# Patient Record
Sex: FEMALE | Race: WHITE | Employment: FULL TIME | URBAN - METROPOLITAN AREA
[De-identification: names, ages, dates, MRNs, and addresses within clinical notes are randomized per-mention and may not be internally consistent; named-entity substitution may affect disease eponyms.]

---

## 2018-04-05 PROCEDURE — 83001 ASSAY OF GONADOTROPIN (FSH): CPT | Performed by: FAMILY MEDICINE

## 2018-04-05 PROCEDURE — 82670 ASSAY OF TOTAL ESTRADIOL: CPT | Performed by: FAMILY MEDICINE

## 2018-04-05 PROCEDURE — 36415 COLL VENOUS BLD VENIPUNCTURE: CPT | Performed by: FAMILY MEDICINE

## 2019-05-02 PROCEDURE — 82784 ASSAY IGA/IGD/IGG/IGM EACH: CPT | Performed by: FAMILY MEDICINE

## 2019-05-02 PROCEDURE — 87389 HIV-1 AG W/HIV-1&-2 AB AG IA: CPT | Performed by: FAMILY MEDICINE

## 2019-05-02 PROCEDURE — 87591 N.GONORRHOEAE DNA AMP PROB: CPT | Performed by: FAMILY MEDICINE

## 2019-05-02 PROCEDURE — 87491 CHLMYD TRACH DNA AMP PROBE: CPT | Performed by: FAMILY MEDICINE

## 2019-05-02 PROCEDURE — 86803 HEPATITIS C AB TEST: CPT | Performed by: FAMILY MEDICINE

## 2019-08-23 PROCEDURE — 87491 CHLMYD TRACH DNA AMP PROBE: CPT | Performed by: FAMILY MEDICINE

## 2019-08-23 PROCEDURE — 87591 N.GONORRHOEAE DNA AMP PROB: CPT | Performed by: FAMILY MEDICINE

## 2019-08-23 PROCEDURE — 86803 HEPATITIS C AB TEST: CPT | Performed by: FAMILY MEDICINE

## 2020-10-27 PROBLEM — R93.1 ECHOCARDIOGRAM ABNORMAL: Status: ACTIVE | Noted: 2020-10-27

## 2021-04-16 PROBLEM — D68.2 FACTOR V DEFICIENCY (HCC): Status: ACTIVE | Noted: 2021-04-16

## 2021-11-16 ENCOUNTER — LAB ENCOUNTER (OUTPATIENT)
Dept: LAB | Age: 46
End: 2021-11-16
Payer: COMMERCIAL

## 2021-11-16 DIAGNOSIS — G62.9 PERIPHERAL NEUROPATHY: Primary | ICD-10-CM

## 2021-11-16 DIAGNOSIS — I82.409 ACUTE DEEP VEIN THROMBOSIS OF LEG (HCC): ICD-10-CM

## 2021-11-16 DIAGNOSIS — I73.9 PERIPHERAL VASCULAR DISEASE (HCC): ICD-10-CM

## 2021-11-16 PROCEDURE — 36415 COLL VENOUS BLD VENIPUNCTURE: CPT

## 2021-11-16 PROCEDURE — 85025 COMPLETE CBC W/AUTO DIFF WBC: CPT

## 2021-11-16 PROCEDURE — 80048 BASIC METABOLIC PNL TOTAL CA: CPT

## 2021-11-16 PROCEDURE — 82306 VITAMIN D 25 HYDROXY: CPT

## 2021-12-11 ENCOUNTER — LAB REQUISITION (OUTPATIENT)
Dept: SURGERY | Age: 46
End: 2021-12-11
Payer: COMMERCIAL

## 2021-12-11 DIAGNOSIS — Z01.818 PREOP EXAMINATION: ICD-10-CM

## 2023-01-22 NOTE — ED QUICK NOTES
Patient states waking up with heart burn at 4:45. States taking 6 Tums, Renetidine & baking soda with no relief. History of cardiomyopathy.

## 2024-03-20 NOTE — PROGRESS NOTES
CHIEF COMPLAINT:     Chief Complaint   Patient presents with    Cough     Cough,congestion, chest tightness x5days       HPI:   Lluvia Grimes is a 48 year old female who presents for upper respiratory symptoms for  4 days. Patient reports congestion, green mucous colored nasal discharge, dry cough.  Associated symptoms include fatigue, sore throat either from pnd or cough, body aches, chilled last night.  Denies fever, headache.  Symptoms have been consistent since onset.  Treating symptoms with cough syrup, nyquil, lozenges.  Used albuterol inhaler a few times today.  States she is prone to bronchitis and pneumonia.       + hx of COVID 2020; No COVID exposure  States had colonoscopy the day prior to getting sick.  No recent travel.  No large gatherings.    Other conditions:   BMI: Body mass index is 22.55 kg/m².      Current Outpatient Medications   Medication Sig Dispense Refill    RABEprazole Sodium 20 MG Oral Tab EC Take 1 tablet (20 mg total) by mouth daily.      levothyroxine 25 MCG Oral Tab TAKE 1 TABLET BY MOUTH DAILY FOR 6 DAYS AND 2 TABLETS DAILY ON SUNDAY. SEPARATE FROM VITAMINS BY AT LEAST 4 HOURS AND 20 MINUTES BEFORE BREAKFAST      benzonatate 200 MG Oral Cap Take 1 capsule (200 mg total) by mouth 3 (three) times daily as needed for cough. 30 capsule 0    predniSONE 20 MG Oral Tab TAKE 1 TABLET BY MOUTH RIGHT AWAY FOR REACTION AS DIRECTED      estradiol 0.1 MG/GM Vaginal Cream Place 1 g vaginally 3 (three) times a week.      buPROPion  MG Oral Tablet 12 Hr Take 2 tablets (300 mg total) by mouth every morning.      ondansetron 4 MG Oral Tablet Dispersible Place 1 tablet (4 mg total) under the tongue 2 (two) times daily as needed.      lisdexamfetamine 40 MG Oral Cap Take 1 capsule (40 mg total) by mouth every morning.      OMEPRAZOLE 40 MG Oral Capsule Delayed Release TAKE 1 CAPSULE(40 MG) BY MOUTH BEFORE BREAKFAST. FOLLOW UP BEFORE REFILL 90 capsule 0    TOPIRAMATE 100 MG Oral Tab TAKE 1  TABLET(100 MG) BY MOUTH TWICE DAILY 180 tablet 0    Venlafaxine HCl ER 75 MG Oral Capsule SR 24 Hr TK 3 CS PO QAM  1    XARELTO 10 MG Oral Tab TK 1 T PO QD WITH FOOD  6    erenumab-aooe 70 MG/ML SC Inject into the skin every 30 (thirty) days.      ALPRAZolam 0.25 MG Oral Tab Take one daily 30 tablet 0    BusPIRone HCl 15 MG Oral Tab 15 mg 2 (two) times daily. 90 tablet 0    Albuterol Sulfate 108 (90 Base) MCG/ACT Inhalation Aerosol Powder, Breath Activated Inhale 1 puff into the lungs 4 (four) times daily as needed. 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    benign per pt    OTHER  01/2018    aneurysm clipping    OTHER SURGICAL HISTORY  11/05/2020    Abdominal sacrocolpopexy with Y graft / Valerio procedure         Social History     Socioeconomic History    Marital status:    Tobacco Use    Smoking status: Never    Smokeless tobacco: Never   Vaping Use    Vaping Use: Never used   Substance and Sexual Activity    Alcohol use: Not Currently     Comment: socially    Drug use: No    Sexual activity: Yes     Partners: Male     Birth control/protection: Hysterectomy         REVIEW OF SYSTEMS:   GENERAL: feels well otherwise, adequate appetite  SKIN: no rashes or abnormal skin lesions  HEENT: See HPI  LUNGS: denies  shortness of breath or wheezing, See HPI  CARDIOVASCULAR: denies chest pain or palpitations   GI: denies N/V/C or abdominal pain  NEURO: denies dizziness or lightheadedness    EXAM:   BP 92/62   Pulse 100   Temp 97.8 °F (36.6 °C) (Oral)   Resp 20   Ht 5' 7\" (1.702 m)   Wt 144 lb (65.3 kg)   LMP  (LMP Unknown)   SpO2 98%   BMI 22.55 kg/m²     Physical Exam  Constitutional:       General: She is not in acute distress.     Appearance: Normal appearance.   HENT:      Head: Normocephalic and atraumatic.      Right Ear: Tympanic membrane and ear canal normal.      Left Ear: Tympanic membrane and ear canal normal.      Nose: Congestion and rhinorrhea present. Rhinorrhea is clear.      Right Sinus: No maxillary sinus tenderness or frontal sinus tenderness.      Left Sinus: No maxillary sinus tenderness or frontal sinus tenderness.      Mouth/Throat:      Lips: Pink.      Mouth: Mucous membranes are moist.      Pharynx: Oropharynx is clear. Uvula midline.      Comments: +pnd.  Eyes:      Extraocular Movements: Extraocular movements intact.      Conjunctiva/sclera: Conjunctivae normal.   Cardiovascular:      Rate and Rhythm: Normal rate and regular rhythm.      Heart sounds: Normal heart sounds. No murmur heard.  Pulmonary:      Effort: Pulmonary effort is normal.      Breath sounds: Normal breath sounds and air entry.   Musculoskeletal:      Cervical back: Normal range of motion and neck supple.   Lymphadenopathy:      Cervical: No cervical adenopathy.   Skin:     General: Skin is warm and dry.   Neurological:      General: No focal deficit present.      Mental Status: She is alert.          Recent Results (from the past 24 hour(s))   Rapid Covid-19    Collection Time: 03/20/24  6:11 PM    Specimen: Nares   Result Value Ref Range    Rapid SARS-CoV-2 by PCR Not Detected Not Detected    POCT Lot Number [id number]     POCT Expiration Date 3/20/24     POCT Procedure Control Control Valid Control Valid     ,432         ASSESSMENT AND PLAN:   Lluvia Grimes is a 48 year old female who presents with     ASSESSMENT:   Encounter Diagnosis   Name Primary?    Viral URI with cough Yes       PLAN:   Discussed likely viral etiology. Reviewed symptom relief measures with patient.   Monitor for worsening symptoms.  Comfort care as described in Patient Instructions  Medications as below.      Meds & Refills for this Visit:  Requested Prescriptions     Signed Prescriptions Disp Refills    benzonatate 200 MG Oral Cap 30 capsule 0     Sig: Take 1 capsule (200 mg total) by mouth 3 (three) times daily as needed for cough.       Risks, benefits, and side effects of medication explained and discussed.  To f/u with PCP if sx do not resolve as anticipated.  The patient indicates understanding of these issues and agrees to the plan.        Patient Instructions   If prescribed, take antibiotics as directed. Finish all the medication even if you feel better.   Probiotics or 1-2 servings of yogurt daily during antibiotic use will help decrease stomach upset and restore good bacteria to the gut/prevent antibiotic associated diarrhea. Separate times by at least 2-4 hours.    General comfort measures:  Get rest!  Hydrate! (cold or hot based on comfort). Drink lots of water or other non dehydrating liquids to help with illness. Salty foods, soups and tea can help with throat pain.   Hand washing-use hand  or wash hands frequently, cover your cough or sneeze, do not share towels or drinks with others.  Salt water gargles (1 tsp. Salt in 6 oz lukewarm water): gargle for 2 minutes, repeat every 15 minutes as needed to help decrease swelling and relieve pain.  Use humidified air, steamy showers/baths and use vaporizer in sleeping quarters to keep secretions thin.  Avoid smoking.    Symptom management:    Nasal congestion/Post-nasal-drip: Saline nasal spray to nostrils to help remove drainage or an antihistamine to help dry up drainage.     Sinus congestion/Post-nasal-drip: OTC Nasacort or Flonase (steroid nasal spray) nightly for 2 weeks. May take Sudafed (D) or Sudafed-PE, if not contraindicated (do not take if you have HTN).   Pain/discomfort:  May use Tylenol or Ibuprofen, if not contraindicated.  Cough:  May take DM-dextromethorophan over the counter (long lasting). Ex: Delsym  Chest congestion:  May take guaifenesin with a lot of water.  Ex:  Plain Mucinex.  Sore throat:  Cepacol lozenges or Chloroseptic throat spray (active ingredient Benzocaine).      Follow up with your PCP in 1-2 weeks if not better.  Follow up in a few days if worsening symptoms. Seek immediate care if inability to swallow or breathe.

## 2025-07-13 NOTE — PROGRESS NOTES
CHIEF COMPLAINT:     Chief Complaint   Patient presents with    Cold     Cough,runny nose,congestion,rt ear pain,Sx x11 day  OTC Mucinex,Nyquil and Dayquil       HPI:   Lluvia Grimes is a 49 year old female who presents for cold symptoms for  11  days. Symptoms have progressed into sinus congestion and been worsening since onset. Sinus congestion/pain is described as a pressure and is located mainly across cheeks.  Patient also reports congestion, yellow colored nasal discharge, cough with   colored sputum, cough is keeping pt up at night, sinus pain. reports dental pain. Has treated symptoms with Mucinex, dayquil and Nyquil.      Current Medications[1]   Past Medical History[2]   Past Surgical History[3]   Family History[4]   Short Social Hx on File[5]      REVIEW OF SYSTEMS:   GENERAL:  denies  diminished appetite  SKIN: no rashes or abnormal skin lesions  HEENT: See HPI.    LUNGS: See HPI  CARDIOVASCULAR: denies chest pain or palpitations   GI: denies N/V/C or abdominal pain  NEURO:  No numbness or tingling in face.    EXAM:   /68   Pulse 88   Temp 98.9 °F (37.2 °C) (Tympanic)   Resp 18   Ht 5' 7\" (1.702 m)   Wt 120 lb (54.4 kg)   LMP  (LMP Unknown)   SpO2 99%   BMI 18.79 kg/m²   GENERAL: well developed, well nourished,in no apparent distress  SKIN: no rashes,no suspicious lesions  HEAD: atraumatic, normocephalic, + tenderness on palpation of maxillary sinuses  EYES: conjunctiva clear, EOM intact  EARS: TM's cloudy gray, no bulging, no retraction, scant fluid, bony landmarks obscured bilaterally   NOSE: nostrils patent, yellow nasal mucous, nasal mucosa reddened and edematous  THROAT: oral mucosa pink, moist. No visible dental caries. Posterior pharynx is  erythematous.  NECK: supple, non-tender  LUNGS: Breathing is non labored. Lungs with wheezes on left upper and middle lobes.    CARDIO: RRR without murmur  EXTREMITIES: no cyanosis, clubbing or edema  LYMPH:  no lymphadenopathy.         ASSESSMENT AND PLAN:   Lluvia Grimes is a 49 year old female who presents with URI symptoms that are consistent with: sinus infection and wheezing, Pt reports not taking inhaled medication for awhile. Discussed ears are not erythematous or bulging at this time. Requested script for Albuterol.       ASSESSMENT:  Encounter Diagnoses   Name Primary?    Acute maxillary sinusitis, recurrence not specified Yes    Medication refill          PLAN: Meds as below.  Comfort care instructions as listed in Patient Instructions    Meds & Refills for this Visit:  Requested Prescriptions     Signed Prescriptions Disp Refills    doxycycline 100 MG Oral Cap 14 capsule 0     Sig: Take 1 capsule (100 mg total) by mouth 2 (two) times daily for 7 days.    albuterol 108 (90 Base) MCG/ACT Inhalation Aero Soln 8.5 g 0     Si puffs 3 times per day for 1 week, then as needed for 1 week.       Risks, benefits, side effects of medication addressed and explained.    There are no Patient Instructions on file for this visit.    The patient indicates understanding of these issues and agrees to the plan.  The patient is asked to f/u with PCP if sx's persist or worsen.           [1]   Current Outpatient Medications   Medication Sig Dispense Refill    doxycycline 100 MG Oral Cap Take 1 capsule (100 mg total) by mouth 2 (two) times daily for 7 days. 14 capsule 0    albuterol 108 (90 Base) MCG/ACT Inhalation Aero Soln 2 puffs 3 times per day for 1 week, then as needed for 1 week. 8.5 g 0    ondansetron 4 MG Oral Tablet Dispersible Place 1 tablet (4 mg total) under the tongue 2 (two) times daily as needed.      lisdexamfetamine 40 MG Oral Cap Take 1 capsule (40 mg total) by mouth every morning.      levothyroxine 25 MCG Oral Tab TAKE 1 TABLET BY MOUTH DAILY FOR 6 DAYS AND 2 TABLETS DAILY ON . SEPARATE FROM VITAMINS BY AT LEAST 4 HOURS AND 20 MINUTES BEFORE BREAKFAST      benzonatate 200 MG Oral Cap Take 1 capsule (200 mg total) by  mouth 3 (three) times daily as needed for cough. 30 capsule 0    predniSONE 20 MG Oral Tab TAKE 1 TABLET BY MOUTH RIGHT AWAY FOR REACTION AS DIRECTED (Patient not taking: Reported on 7/13/2025)      estradiol 0.1 MG/GM Vaginal Cream Place 1 g vaginally 3 (three) times a week.      buPROPion  MG Oral Tablet 12 Hr Take 2 tablets (300 mg total) by mouth every morning.      OMEPRAZOLE 40 MG Oral Capsule Delayed Release TAKE 1 CAPSULE(40 MG) BY MOUTH BEFORE BREAKFAST. FOLLOW UP BEFORE REFILL 90 capsule 0    TOPIRAMATE 100 MG Oral Tab TAKE 1 TABLET(100 MG) BY MOUTH TWICE DAILY 180 tablet 0    Venlafaxine HCl ER 75 MG Oral Capsule SR 24 Hr TK 3 CS PO QAM  1    XARELTO 10 MG Oral Tab TK 1 T PO QD WITH FOOD  6    erenumab-aooe 70 MG/ML SC Inject into the skin every 30 (thirty) days.      ALPRAZolam 0.25 MG Oral Tab Take one daily 30 tablet 0    BusPIRone HCl 15 MG Oral Tab 15 mg 2 (two) times daily. 90 tablet 0    Albuterol Sulfate 108 (90 Base) MCG/ACT Inhalation Aerosol Powder, Breath Activated Inhale 1 puff into the lungs 4 (four) times daily as needed. 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    benign per pt    Other  01/2018    aneurysm clipping    Other surgical history  11/05/2020    Abdominal sacrocolpopexy with Y graft / Valerio procedure   [4]   Family  History  Problem Relation Age of Onset    Other (Other) Father     Heart Disorder Mother     Breast Cancer Paternal Grandmother         age at dx unknown   [5]   Social History  Socioeconomic History    Marital status:    Tobacco Use    Smoking status: Never    Smokeless tobacco: Never   Vaping Use    Vaping status: Never Used   Substance and Sexual Activity    Alcohol use: Not Currently     Comment: socially    Drug use: No    Sexual activity: Yes     Partners: Male     Birth control/protection: Hysterectomy